# Patient Record
Sex: FEMALE
[De-identification: names, ages, dates, MRNs, and addresses within clinical notes are randomized per-mention and may not be internally consistent; named-entity substitution may affect disease eponyms.]

---

## 2017-12-17 ENCOUNTER — HOSPITAL ENCOUNTER (EMERGENCY)
Dept: HOSPITAL 92 - ERS | Age: 32
LOS: 1 days | Discharge: HOME | End: 2017-12-18
Payer: COMMERCIAL

## 2017-12-17 DIAGNOSIS — Z3A.00: ICD-10-CM

## 2017-12-17 DIAGNOSIS — O99.519: Primary | ICD-10-CM

## 2017-12-17 DIAGNOSIS — J11.1: ICD-10-CM

## 2017-12-17 PROCEDURE — 99283 EMERGENCY DEPT VISIT LOW MDM: CPT

## 2018-05-14 ENCOUNTER — HOSPITAL ENCOUNTER (INPATIENT)
Dept: HOSPITAL 92 - L&D/OP | Age: 33
LOS: 3 days | Discharge: HOME | End: 2018-05-17
Attending: OBSTETRICS & GYNECOLOGY | Admitting: OBSTETRICS & GYNECOLOGY
Payer: COMMERCIAL

## 2018-05-14 VITALS — BODY MASS INDEX: 39 KG/M2

## 2018-05-14 DIAGNOSIS — Z3A.35: ICD-10-CM

## 2018-05-14 PROCEDURE — 86780 TREPONEMA PALLIDUM: CPT

## 2018-05-14 PROCEDURE — 85027 COMPLETE CBC AUTOMATED: CPT

## 2018-05-14 PROCEDURE — 86901 BLOOD TYPING SEROLOGIC RH(D): CPT

## 2018-05-14 PROCEDURE — 51702 INSERT TEMP BLADDER CATH: CPT

## 2018-05-14 PROCEDURE — 88342 IMHCHEM/IMCYTCHM 1ST ANTB: CPT

## 2018-05-14 PROCEDURE — 84156 ASSAY OF PROTEIN URINE: CPT

## 2018-05-14 PROCEDURE — 87340 HEPATITIS B SURFACE AG IA: CPT

## 2018-05-14 PROCEDURE — 87660 TRICHOMONAS VAGIN DIR PROBE: CPT

## 2018-05-14 PROCEDURE — 81003 URINALYSIS AUTO W/O SCOPE: CPT

## 2018-05-14 PROCEDURE — 86900 BLOOD TYPING SEROLOGIC ABO: CPT

## 2018-05-14 PROCEDURE — 82570 ASSAY OF URINE CREATININE: CPT

## 2018-05-14 PROCEDURE — 59025 FETAL NON-STRESS TEST: CPT

## 2018-05-14 PROCEDURE — 99285 EMERGENCY DEPT VISIT HI MDM: CPT

## 2018-05-14 PROCEDURE — 88307 TISSUE EXAM BY PATHOLOGIST: CPT

## 2018-05-14 PROCEDURE — 85461 HEMOGLOBIN FETAL: CPT

## 2018-05-14 PROCEDURE — 36415 COLL VENOUS BLD VENIPUNCTURE: CPT

## 2018-05-14 PROCEDURE — 84550 ASSAY OF BLOOD/URIC ACID: CPT

## 2018-05-14 PROCEDURE — 80053 COMPREHEN METABOLIC PANEL: CPT

## 2018-05-14 PROCEDURE — 86850 RBC ANTIBODY SCREEN: CPT

## 2018-05-14 PROCEDURE — 87510 GARDNER VAG DNA DIR PROBE: CPT

## 2018-05-14 PROCEDURE — 96372 THER/PROPH/DIAG INJ SC/IM: CPT

## 2018-05-14 PROCEDURE — 87480 CANDIDA DNA DIR PROBE: CPT

## 2018-05-14 PROCEDURE — 81001 URINALYSIS AUTO W/SCOPE: CPT

## 2018-05-14 PROCEDURE — 90384 RH IG FULL-DOSE IM: CPT

## 2018-05-15 LAB
ALBUMIN SERPL BCG-MCNC: 3.2 G/DL (ref 3.5–5)
ALP SERPL-CCNC: 142 U/L (ref 40–150)
ALT SERPL W P-5'-P-CCNC: 16 U/L (ref 8–55)
ANION GAP SERPL CALC-SCNC: 14 MMOL/L (ref 10–20)
AST SERPL-CCNC: 20 U/L (ref 5–34)
BILIRUB SERPL-MCNC: 0.2 MG/DL (ref 0.2–1.2)
BUN SERPL-MCNC: 10 MG/DL (ref 7–18.7)
CALCIUM SERPL-MCNC: 8.8 MG/DL (ref 7.8–10.44)
CHLORIDE SERPL-SCNC: 109 MMOL/L (ref 98–107)
CO2 SERPL-SCNC: 18 MMOL/L (ref 22–29)
CREAT CL PREDICTED SERPL C-G-VRATE: 193 ML/MIN (ref 70–130)
CRYSTAL-AUWI FLAG: 0.4 (ref 0–15)
GLOBULIN SER CALC-MCNC: 3.2 G/DL (ref 2.4–3.5)
GLUCOSE SERPL-MCNC: 87 MG/DL (ref 70–105)
HBSAG INDEX: 0.16 S/CO (ref 0–0.99)
HEV IGM SER QL: 57.1 (ref 0–7.99)
HGB BLD-MCNC: 12.1 G/DL (ref 12–16)
HYALINE CASTS #/AREA URNS LPF: (no result) LPF
MCH RBC QN AUTO: 28.3 PG (ref 27–31)
MCV RBC AUTO: 85.2 FL (ref 81–99)
PATHC CAST-AUWI FLAG: 1.16 (ref 0–2.49)
PLATELET # BLD AUTO: 258 THOU/UL (ref 130–400)
POTASSIUM SERPL-SCNC: 4 MMOL/L (ref 3.5–5.1)
PROT UR-MCNC: 16 MG/DL
RBC # BLD AUTO: 4.28 MILL/UL (ref 4.2–5.4)
RBC UR QL AUTO: (no result) HPF (ref 0–3)
SODIUM SERPL-SCNC: 137 MMOL/L (ref 136–145)
SP GR UR STRIP: 1.02 (ref 1–1.04)
SPERM-AUWI FLAG: 0 (ref 0–9.9)
SYPHILIS ANTIBODY INDEX: 0.05 S/CO
URATE SERPL-MCNC: 6.5 MG/DL (ref 2.6–6)
WBC # BLD AUTO: 11.1 THOU/UL (ref 4.8–10.8)
WBC UR QL AUTO: (no result) HPF (ref 0–3)
YEAST-AUWI FLAG: 0 (ref 0–25)

## 2018-05-15 RX ADMIN — Medication SCH MLS: at 16:05

## 2018-05-15 RX ADMIN — MAGNESIUM SULFATE HEPTAHYDRATE SCH MLS: 40 INJECTION, SOLUTION INTRAVENOUS at 06:19

## 2018-05-15 RX ADMIN — Medication SCH MLS: at 07:50

## 2018-05-15 RX ADMIN — DOCUSATE CALCIUM SCH MG: 240 CAPSULE, LIQUID FILLED ORAL at 20:04

## 2018-05-15 RX ADMIN — HYDROCODONE BITARTRATE AND ACETAMINOPHEN PRN TAB: 5; 325 TABLET ORAL at 23:13

## 2018-05-15 RX ADMIN — HYDROCODONE BITARTRATE AND ACETAMINOPHEN PRN TAB: 5; 325 TABLET ORAL at 18:54

## 2018-05-15 RX ADMIN — MAGNESIUM SULFATE HEPTAHYDRATE SCH MLS: 40 INJECTION, SOLUTION INTRAVENOUS at 14:32

## 2018-05-15 RX ADMIN — HYDROCODONE BITARTRATE AND ACETAMINOPHEN PRN TAB: 5; 325 TABLET ORAL at 11:15

## 2018-05-15 NOTE — PDOC.LDHP
Labor and Delivery H&P


Chief complaint: other (abdominal cramping)


HPI: 





32 year old  at 35.3 wks with JUAN M of 2018 based on 11.5 wk u/s 

that presents for lower abdominal cramping and maroon colored vaginal discharge 

evidence on wiping. The symptoms started this afternoon while in the shower. 

She promptly decided to come to [facility] for evaluation, although her plan 

was to deliver at the [facility] because she desires BTL. She did not think she had 

time to make it over to [facility]. Current pregnancy is complicated by GBS 

bacteriuria s/p treatment with amoxicillin, but otherwise uncomplicated. 

Patient diagnosed with severe preeclampsia during last pregnancy and had to be 

induced prior to 37 weeks. She has been on prophylactic ASA therapy since the 

beginning of the pregnancy. Patient denies any headache, vision changes, RUQ 

pain, swelling, or shortness of breath. 








ROS:


General: Denies fever or chills. Denies changes in appetite. 


HEENT: Endorses nasal congestion. Denies ear pain, headache, or vision changes.


Cards: Denies chest pain and palpitations


Resp: Denies shortness of breath or cough


GI: Endorses abdominal cramping. Denies diarrhea, N/V


ALVARO: Denies edema or muscle aches


Current gestational age (weeks): 35 (35.3 wks)


Grav: 5


Para: 3


OB History Details: 





1. Hx of preeclampsia on ASA therapy


2. GBS bacteriuria


3. Rh negative s/p rhogam


4. Desires BTL


Current pregnancy complications: none


Abnormal US findings: No


Past Medical History: 





None


Current medications: pre-shahida vitamins, other (ASA 81 mg)


Previous surgical history: none


Allergies/Adverse Reactions: 


 Allergies











Allergy/AdvReac Type Severity Reaction Status Date / Time


 


No Known Allergies Allergy   Unverified 18 23:38











Social history: none





- Physical Exam


Abnormal vital signs: Severe range pressures with SBP in 180's. BP downtrending

, but still >140


General: NAD, other (appears anxious)


Heart: RRR


Lungs: CTAB


Abdomen: NTTP


Extremeties: no edema


FHT: category 1, variability present


[place] contractions every: Irregular





- Vaginal Exam


cm dilated: 1 (By Ambrocio at 01:45)


Effacement: 25%


Station: -3





- OB Labs


Blood type: O


RH: negative


Antibody Screen: negative


HIV: negative


RPR: negative


HEPSAg: negative


GBS: unknown


Rubella: immune





- Assessment





1. Severe range BP's with history of severe preeclampsia in prior pregnancy


2. Hx severe preeclampsia on ASA


3. GBS bacteriuria


4. Rh negative s/p rhogam 





- Plan


Plan: admit to L&D


-: 





1. Admit to L&D


2. Preeclampsia workup: CBC, CMP, Uric acid, Urine protein, Urine creatinine


3. Monitor BP


4. Continuous fetal monitoring


5. Betamethasone 12 mg injection to promote fetal lung maturity


6. UA to rule out infection


7. VP3 pending 


8. GBS positive, will need ppx at delivery


9. Rh negative s/p rhogam on 18

## 2018-05-15 NOTE — PDOC.PP
Post Partum Progress Note


Post Partum Day #: 1


Subjective: 





Pt doing well. Resting comfortably. SBP < 150 with one BP of 171, however 

resolved on it's own after rechecking, and was during the process of 

transitioning the patient to a new room.  Denies HA, vision changes, RUQ pain, 

LE pain, SOB or any other concerns.





PO intake tolerated: yes


 Weight











Weight                         90.718 kg

















- Physical Examination


General: NAD


Cardiovascular: no m/r/g


Respiratory: clear to auscultation bilaterally, non-labored breathing


Abdominal: no distention, appropriately TTP


Extremities: negative homans (B) (Trace edema LE bilaterally.  Patellar 

reflexes +1 bilaterally)


Neurological: no gross focal deficits


Psychiatric: A&Ox3, normal affect


Result Diagrams: 


 05/15/18 02:15





 05/15/18 02:15


Additional Labs: 


 Post Partum Labs











Blood Type  O NEGATIVE   05/15/18  02:15    


 


Hep Bs Antigen  Non-Reactive S/CO (NonReactive)   05/15/18  02:15    











(1) Severely increased blood pressure and swelling during pregnancy


Code(s): O14.10 - SEVERE PRE-ECLAMPSIA, UNSPECIFIED TRIMESTER   Status: Acute   

Comment: Elevated BP > 180 in labor, however S/p , and BP significantly 

improved. SBP < 150 PP with exception of 1 , which resolved on it's own. 

Currently asymptomatic. Continue to monitor and Magnesium checks. Patient 

tolerating Mag @ 2. 


Output > 1L in past hour. Continue to monitor I/O.


   





(2) GBS (group B streptococcus) UTI complicating pregnancy


Code(s): O23.40 - UNSP INFECTION OF URINARY TRACT IN PREGNANCY, UNSP TRIMESTER; 

B95.1 - STREPTOCOCCUS, GROUP B, CAUSING DISEASES CLASSD ELSWHR   Status: Acute 

  Comment: GBS bacteriuria s/p treatment with antibiotics


- Inadequately treated, s/p one dose of penicillin during labor   





(3)  labor, delivered, current hospitalization


Code(s): O60.10X0 -  LABOR W  DELIVERY, UNSP TRIMESTER, UNSP   

Status: Acute   Comment: 33 y/o  @35.4 WGA by 11.5 wk sono delivered via 

 APGARS 8,9


PPD#0


Infant went to NICU due to .


-Placenta sent for path, concern for abruption on gross assessment


-Norco prn pain, will avoid NSAID's due to elevated BP's


-Continue PNV


-Will use breast pump at this time as  in the NICU


-Rhogam pending  blood type, s/p rhogam at 28 weeks for Rh negative 

blood type   





(4) Rh negative, delivered, current hospitalization


Code(s): O26.899 - OTH PREGNANCY RELATED CONDITIONS, UNSPECIFIED TRIMESTER; 

Z67.91 - UNSPECIFIED BLOOD TYPE, RH NEGATIVE   Status: Acute   Comment: Patient 

O negative, s/p Rhogam at 28 wks. 


-Will give Rhogam pending  blood type   





- Assessment/Plan





Continue PP management and Magnesium for at least 8 hours PP due to severe 

blood pressures.

## 2018-05-15 NOTE — PDOC.PP
Post Partum Progress Note


Post Partum Day #: 0


Subjective: 


33 y/o  @35.4 WGA by 11.5 wk sono 


PPD#0


Patient doing well. Denies pre-e symptoms including RUQ pain, headache, scotoma

, SOB. Having some abdominal cramping. 


PO intake tolerated: no


Flatus: no


Ambulation: no


 Vital Signs (12 hours)











  Temp Pulse Resp


 


 05/15/18 01:40  98.4 F  72  18








 Weight











Weight                         90.718 kg

















- Physical Examination


General: NAD


Cardiovascular: no m/r/g, RRR


Respiratory: clear to auscultation bilaterally, non-labored breathing


Abdominal: + bowel sounds, lochia, no distention, appropriately TTP


Fundus firm & at: 1 cm above the umbilicus


Extremities: negative homans (B)


Neurological: no gross focal deficits (2+ patellar reflexes)


Psychiatric: A&Ox3, normal affect


Result Diagrams: 


 05/15/18 02:15





 05/15/18 02:15


Additional Labs: 


 Post Partum Labs











Blood Type  O NEGATIVE   05/15/18  02:15    


 


Hep Bs Antigen  Non-Reactive S/CO (NonReactive)   05/15/18  02:15    











(1)  labor, delivered, current hospitalization


Code(s): O60.10X0 -  LABOR W  DELIVERY, UNSP TRIMESTER, UNSP   

Status: Acute   Comment: 33 y/o  @35.4 WGA by 11.5 wk sono delivered via 




PPD#0


-Placenta sent for path, concern for abruption on gross assessment


-Norco prn pain, will avoid NSAID's due to elevated BP's


-Continue PNV


-Will use breast pump at this time as  in the NICU


-Rhogam pending  blood type, s/p rhogam at 28 weeks for Rh negative 

blood type   





(2) GBS (group B streptococcus) UTI complicating pregnancy


Code(s): O23.40 - UNSP INFECTION OF URINARY TRACT IN PREGNANCY, UNSP TRIMESTER; 

B95.1 - STREPTOCOCCUS, GROUP B, CAUSING DISEASES CLASSD ELSWHR   Status: Acute 

  Comment: GBS bacteriuria s/p treatment with antibiotics


- s/p one dose of penicillin during labor   





(3) Gestational HTN


Code(s): O13.9 - GESTATIONAL HTN W/O SIGNIFICANT PROTEINURIA, UNSP TRIMESTER   

Status: Acute   


  QualifierTitle:    Trimester: third trimester   Qualified Code(s): O13.3 - 

Gestational [pregnancy-induced] hypertension without significant proteinuria, 

third trimester   


Comment: New onset severe Gestational HTN. Hx of severe preeclampsia in prior 

pregnancy


No evidence of preeclampsia based on labs and symptoms


-Monitor BP


-Labetalol prn 


-Continuing Mag for 8 hours post-partum due to severe range BP's that pt has 

continued to have. 


-Mag checks q4h (patellar reflexes 2+)   





(4) Rh negative, delivered, current hospitalization


Code(s): O26.899 - OTH PREGNANCY RELATED CONDITIONS, UNSPECIFIED TRIMESTER; 

Z67.91 - UNSPECIFIED BLOOD TYPE, RH NEGATIVE   Status: Acute   Comment: Patient 

O negative, s/p Rhogam at 28 wks. 


-Will give Rhogam pending  blood type   





<Negin Dugan - Last Filed: 05/15/18 11:17>


 Vital Signs (12 hours)











  Temp Pulse Resp BP


 


 18 05:07  98.3 F  76  18  128/72


 


 18 05:00  98.3 F  80  18 


 


 05/15/18 20:00  98.3 F  80  18  138/78








 Weight











Weight                         200 lb














Result Diagrams: 


 18 04:56





 05/15/18 02:15


Additional Labs: 


 Post Partum Labs











Blood Type  O NEGATIVE   05/15/18  02:15    


 


Hep Bs Antigen  Non-Reactive S/CO (NonReactive)   05/15/18  02:15    














<Anastacio Penaloza - Last Filed: 18 07:04>





Attending Addendum





- Attending Addendum


Date/Time: 18 0704





I personally evaluated the patient and discussed the management with Dr. Dugan


I agree with the History, Examination, Assessment and Plan documented above 

with any addition or exceptions noted below.








<Anastacio Penaloza - Last Filed: 18 07:04>

## 2018-05-15 NOTE — PDOC.PP
Post Partum Progress Note


Post Partum Day #: 0


Subjective: 





Pt resting comfortably. No concerns. Denies any HA, CP, SOB, vision changes, 

RUQ pain. 


PO intake tolerated: yes


Flatus: yes


 Weight











Weight                         90.718 kg

















- Physical Examination


General: NAD


Cardiovascular: no m/r/g, RRR


Respiratory: clear to auscultation bilaterally, non-labored breathing


Abdominal: + bowel sounds, appropriately TTP (Uterus below umbilicus and firm)


Extremities: negative homans (B)


Skin: no rash


Neurological: no gross focal deficits (+2 reflexes Patella bilaterally)


Result Diagrams: 


 05/15/18 02:15





 05/15/18 02:15


Additional Labs: 


 Post Partum Labs











Blood Type  O NEGATIVE   05/15/18  02:15    


 


Hep Bs Antigen  Non-Reactive S/CO (NonReactive)   05/15/18  02:15    











(1) Severely increased blood pressure and swelling during pregnancy


Code(s): O14.10 - SEVERE PRE-ECLAMPSIA, UNSPECIFIED TRIMESTER   Status: Acute   

Comment: Elevated BP > 180 in labor, however S/p , and BP significantly 

improved. SBP < 160 PP with exception of 1 , which resolved on it's own. 

Currently asymptomatic. 


Good diuresis. Input 2215.  Output > 1950 since 10:00am. 


Will d/c Magnesium as now 8hr s/p Delivery.


   





(2) GBS (group B streptococcus) UTI complicating pregnancy


Code(s): O23.40 - UNSP INFECTION OF URINARY TRACT IN PREGNANCY, UNSP TRIMESTER; 

B95.1 - STREPTOCOCCUS, GROUP B, CAUSING DISEASES CLASSD ELSWHR   Status: Acute 

  Comment: GBS bacteriuria s/p treatment with antibiotics


- Inadequately treated, s/p one dose of penicillin during labor   





(3)  labor, delivered, current hospitalization


Code(s): O60.10X0 -  LABOR W  DELIVERY, UNSP TRIMESTER, UNSP   

Status: Acute   Comment: 33 y/o  @35.4 WGA by 11.5 wk sono delivered via 

 APGARS 8,9


PPD#0


Infant went to NICU due to . Was s/p betamethasone x1 dose.


-Placenta sent for path, concern for abruption on gross assessment


-Norco prn pain, will avoid NSAID's due to elevated BP's


-Continue PNV


-Will use breast pump at this time as  in the NICU


-Rhogam pending  blood type, s/p rhogam at 28 weeks for Rh negative 

blood type   





(4) Rh negative, delivered, current hospitalization


Code(s): O26.899 - OTH PREGNANCY RELATED CONDITIONS, UNSPECIFIED TRIMESTER; 

Z67.91 - UNSPECIFIED BLOOD TYPE, RH NEGATIVE   Status: Acute   Comment: Patient 

O negative, s/p Rhogam at 28 wks. 


-Will give Rhogam pending  blood type

## 2018-05-15 NOTE — PDOC.LDPN
Labor & Delivery Progress Note





- Subjective


Subjective: painful contractions





- Objective


Abnormal vital signs: 's


General: breathing through contractions


Uterine fundus: non tender


SVE: 05:30


Dilation: 4


Effacement: 90%


Station: -2


FHT: category 1, variability present


[place] contractions every: q4 minutes





- Assessment


(1)  labor in third trimester


Code(s): O60.03 -  LABOR WITHOUT DELIVERY, THIRD TRIMESTER   Current 

Visit: Yes   Status: Acute   


Qualifiers: 


    labor delivery status: without delivery   Qualified Code(s): O60.03 

-  labor without delivery, third trimester   


Comment: 32 year old  @ 35.4 wks presents for elevated BP's and 

abdominal cramping


- Cervical change from 20/-3 to /-2 in a 3-4 hour period


- Pt given one dose of betamethasone on admission last night


- Mg started for neuroprotection 


- Expectant delivery


- Q4H Mg checks


- Category 1 strip


- GBS prophylaxis   





(2) GBS (group B streptococcus) UTI complicating pregnancy


Code(s): O23.40 - UNSP INFECTION OF URINARY TRACT IN PREGNANCY, UNSP TRIMESTER; 

B95.1 - STREPTOCOCCUS, GROUP B, CAUSING DISEASES CLASSD ELSWHR   Current Visit: 

Yes   Status: Acute   Comment: - GBS bacteriuria s/p treatment with antibiotics


- ppx antibiotics    





(3) Gestational HTN


Code(s): O13.9 - GESTATIONAL HTN W/O SIGNIFICANT PROTEINURIA, UNSP TRIMESTER   

Current Visit: Yes   Status: Acute   Comment: - New onset


- No evidence of preeclampsia based on labs and symptoms


- Monitor BP


- Hx of severe preeclampsia in prior pregnancy   


Plan: continue plan of care


-: 





Expectant management, epidural for pain control

## 2018-05-15 NOTE — PDOC.OPDEL
OB Operative/Delivery Note


Delivery Dr/Surgeon: Dr. Negin Dugan, Dr. Cristiano Salinas, Attending Dr. Will Ramirez


Pre-Delivery Diagnosis: active labor ( labor)


Procedure/Post Delivery Dx: spontaneous vaginal delivery


Weeks gestation: 35 (35w4d)


Anesthesia: epidural





- Findings


  ** A


Sex: male


Apgar - 1 min: 8


Apgar - 5 min: 9





- Additional Findings/Plan


Placenta delivered: spontaneous


Repaired Obstetrical Laceration: none


Estimated blood loss: 200 mL


Compilations/Other Findings: 


This is 33 yo  F   @ 35.4wks who delivered a viable M infant at 0750 on 5

/15/2018. Following an uneventful antepartum course, a vigorous M was delivered 

over an intact perineum in the occipitoanterior position.  Anterior Shoulder 

and then remainder of the body delivered.  No nuchal cord. The head was held 

down and mouth and nares were bulb suctioned. Cord clamped and cut and cord 

blood collected. Placenta delivered intact with a 3 vessel cord noted. Placenta 

was found to have a small abruption. Fundal massage was performed and the 

fundus was firm. The cervix and vagina were inspected and found to be free of 

lacerations. Infant went to  nursery in good condition for routine care.

  Apgars were 8/9 at 1 & 5 minutes, respectively.  Patient tolerated delivery 

well and went to postpartum after routine recovery/care.


Post delivery plan: recovery in LICU

## 2018-05-16 LAB
HGB BLD-MCNC: 12.2 G/DL (ref 12–16)
MCH RBC QN AUTO: 27.7 PG (ref 27–31)
MCV RBC AUTO: 85.1 FL (ref 81–99)
PLATELET # BLD AUTO: 273 THOU/UL (ref 130–400)
RBC # BLD AUTO: 4.38 MILL/UL (ref 4.2–5.4)
WBC # BLD AUTO: 16.6 THOU/UL (ref 4.8–10.8)

## 2018-05-16 RX ADMIN — HYDROCODONE BITARTRATE AND ACETAMINOPHEN PRN TAB: 5; 325 TABLET ORAL at 09:09

## 2018-05-16 RX ADMIN — DOCUSATE CALCIUM SCH MG: 240 CAPSULE, LIQUID FILLED ORAL at 09:09

## 2018-05-16 NOTE — PDOC.PP
Post Partum Progress Note


Post Partum Day #: 2


Subjective: 





Seeing baby in NICU at time of exam. No acute events overnight. Answered all 

questions. 


PO intake tolerated: yes


Flatus: no


Ambulation: yes


 Vital Signs (12 hours)











  Temp Pulse Resp BP


 


 18 05:07  98.3 F  76  18  128/72


 


 18 05:00  98.3 F  80  18 


 


 05/15/18 20:00  98.3 F  80  18  138/78


 


 05/15/18 18:45  98.5 F  81  18  136/63








 Weight











Weight                         90.718 kg

















- Physical Examination


General: NAD


Cardiovascular: no m/r/g, RRR


Respiratory: clear to auscultation bilaterally, non-labored breathing


Abdominal: + bowel sounds, lochia, no distention, appropriately TTP


Fundus firm & at: umbilicus 


Extremities: negative homans (B)


Neurological: no gross focal deficits


Psychiatric: A&Ox3, normal affect


Result Diagrams: 


 18 04:56





 05/15/18 02:15


Additional Labs: 


 Post Partum Labs











Blood Type  O NEGATIVE   05/15/18  02:15    


 


Hep Bs Antigen  Non-Reactive S/CO (NonReactive)   05/15/18  02:15    











(1) Gestational HTN


Code(s): O13.9 - GESTATIONAL HTN W/O SIGNIFICANT PROTEINURIA, UNSP TRIMESTER   

Status: Acute   


  QualifierTitle:    Trimester: third trimester   Qualified Code(s): O13.3 - 

Gestational [pregnancy-induced] hypertension without significant proteinuria, 

third trimester   


Comment: BP trended up overnight but all less than 140/90


- PRNs available


- monitor overnight.    





(2) GBS (group B streptococcus) UTI complicating pregnancy


Code(s): O23.40 - UNSP INFECTION OF URINARY TRACT IN PREGNANCY, UNSP TRIMESTER; 

B95.1 - STREPTOCOCCUS, GROUP B, CAUSING DISEASES CLASSD ELSWHR   Status: Acute 

  Comment: GBS bacteriuria s/p treatment with antibiotics


- Inadequately treated, s/p one dose of penicillin during labor   





(3)  labor in third trimester


Code(s): O60.03 -  LABOR WITHOUT DELIVERY, THIRD TRIMESTER   Status: 

Acute   


  QualifierTitle:     labor delivery status: without delivery   

Qualified Code(s): O60.03 -  labor without delivery, third trimester   


Comment: 32 year old  @ 35.4 wks presents for elevated BP's and 

abdominal cramping


- delivered.    





(4) Rh negative, delivered, current hospitalization


Code(s): O26.899 - OTH PREGNANCY RELATED CONDITIONS, UNSPECIFIED TRIMESTER; 

Z67.91 - UNSPECIFIED BLOOD TYPE, RH NEGATIVE   Status: Acute   Comment: given 1 

dose of rhogam yesterday   





<Cristiano Salinas - Last Filed: 18 06:51>


 Vital Signs (12 hours)











  Temp Pulse Resp BP


 


 18 05:07  98.3 F  76  18  128/72


 


 18 05:00  98.3 F  80  18 


 


 05/15/18 20:00  98.3 F  80  18  138/78








 Weight











Weight                         200 lb














Result Diagrams: 


 18 04:56





 05/15/18 02:15


Additional Labs: 


 Post Partum Labs











Blood Type  O NEGATIVE   05/15/18  02:15    


 


Hep Bs Antigen  Non-Reactive S/CO (NonReactive)   05/15/18  02:15    














<Anastacio Penaloza - Last Filed: 18 07:09>





Attending Addendum





- Attending Addendum


Date/Time: 18 0709





I personally evaluated the patient and discussed the management with Dr. Salinas


I agree with the History, Examination, Assessment and Plan documented above 

with any addition or exceptions noted below.








<Anastacio Penaloza - Last Filed: 18 07:09>

## 2018-05-17 VITALS — DIASTOLIC BLOOD PRESSURE: 84 MMHG | TEMPERATURE: 98.8 F | SYSTOLIC BLOOD PRESSURE: 149 MMHG

## 2018-05-17 RX ADMIN — DOCUSATE CALCIUM SCH MG: 240 CAPSULE, LIQUID FILLED ORAL at 08:23

## 2018-05-17 RX ADMIN — HYDROCODONE BITARTRATE AND ACETAMINOPHEN PRN TAB: 5; 325 TABLET ORAL at 08:23

## 2018-05-17 NOTE — PDOC.PP
Post Partum Progress Note


Post Partum Day #: 2


Subjective: 





No concerned. feeding baby in NICU. discussed discharge from hospital. She was 

in agreement. 


PO intake tolerated: yes


Flatus: yes


Ambulation: yes


 Vital Signs (12 hours)











  Temp Pulse Resp


 


 18 00:00  98.2 F  65  20


 


 18 20:00  98.2 F  73  20








 Weight











Weight                         90.718 kg

















- Physical Examination


General: NAD


Cardiovascular: no m/r/g, RRR


Respiratory: clear to auscultation bilaterally, non-labored breathing


Abdominal: + bowel sounds, lochia, no distention, appropriately TTP


Extremities: negative homans (B)


Neurological: no gross focal deficits


Psychiatric: A&Ox3, normal affect


Result Diagrams: 


 18 04:56





 05/15/18 02:15


Additional Labs: 


 Post Partum Labs











Blood Type  O NEGATIVE   05/15/18  02:15    


 


Hep Bs Antigen  Non-Reactive S/CO (NonReactive)   05/15/18  02:15    











(1) Gestational HTN


Code(s): O13.9 - GESTATIONAL HTN W/O SIGNIFICANT PROTEINURIA, UNSP TRIMESTER   

Status: Acute   


Qualifiers: 


   Trimester: third trimester   Qualified Code(s): O13.3 - Gestational [

pregnancy-induced] hypertension without significant proteinuria, third 

trimester   


Comment: BP stable only one BP >140 in past 24 hours


- plan for d/c today.    





(2) GBS (group B streptococcus) UTI complicating pregnancy


Code(s): O23.40 - UNSP INFECTION OF URINARY TRACT IN PREGNANCY, UNSP TRIMESTER; 

B95.1 - STREPTOCOCCUS, GROUP B, CAUSING DISEASES CLASSD ELSWHR   Status: Acute 

  Comment: GBS bacteriuria s/p treatment with antibiotics


- Inadequately treated, s/p one dose of penicillin during labor   





(3)  labor in third trimester


Code(s): O60.03 -  LABOR WITHOUT DELIVERY, THIRD TRIMESTER   Status: 

Acute   


Qualifiers: 


    labor delivery status: without delivery   Qualified Code(s): O60.03 

-  labor without delivery, third trimester   


Comment: 32 year old  @ 35.4 wks presents for elevated BP's and 

abdominal cramping


- delivered.    





(4) Rh negative, delivered, current hospitalization


Code(s): O26.899 - OTH PREGNANCY RELATED CONDITIONS, UNSPECIFIED TRIMESTER; 

Z67.91 - UNSPECIFIED BLOOD TYPE, RH NEGATIVE   Status: Acute   Comment: given 1 

dose of rhogam PP day 0